# Patient Record
Sex: FEMALE | Race: WHITE | NOT HISPANIC OR LATINO | Employment: FULL TIME | URBAN - METROPOLITAN AREA
[De-identification: names, ages, dates, MRNs, and addresses within clinical notes are randomized per-mention and may not be internally consistent; named-entity substitution may affect disease eponyms.]

---

## 2017-05-09 ENCOUNTER — OFFICE VISIT (OUTPATIENT)
Dept: PODIATRY | Facility: CLINIC | Age: 37
End: 2017-05-09
Payer: COMMERCIAL

## 2017-05-09 VITALS — BODY MASS INDEX: 28.84 KG/M2 | WEIGHT: 156.75 LBS | HEIGHT: 62 IN

## 2017-05-09 DIAGNOSIS — M72.2 PLANTAR FASCIITIS: ICD-10-CM

## 2017-05-09 DIAGNOSIS — M21.6X9 EQUINUS DEFORMITY OF FOOT: Primary | ICD-10-CM

## 2017-05-09 DIAGNOSIS — S90.122A CONTUSION OF LESSER TOE OF LEFT FOOT WITHOUT DAMAGE TO NAIL, INITIAL ENCOUNTER: ICD-10-CM

## 2017-05-09 PROCEDURE — 1160F RVW MEDS BY RX/DR IN RCRD: CPT | Mod: S$GLB,,, | Performed by: PODIATRIST

## 2017-05-09 PROCEDURE — 99999 PR PBB SHADOW E&M-NEW PATIENT-LVL III: CPT | Mod: PBBFAC,,, | Performed by: PODIATRIST

## 2017-05-09 PROCEDURE — 99204 OFFICE O/P NEW MOD 45 MIN: CPT | Mod: S$GLB,,, | Performed by: PODIATRIST

## 2017-05-09 RX ORDER — METHYLPREDNISOLONE 4 MG/1
TABLET ORAL
Qty: 1 PACKAGE | Refills: 0 | Status: SHIPPED | OUTPATIENT
Start: 2017-05-09 | End: 2017-05-30

## 2017-05-09 RX ORDER — APREMILAST 30 MG/1
TABLET, FILM COATED ORAL
COMMUNITY
Start: 2017-03-08

## 2017-05-09 RX ORDER — KETOCONAZOLE 20 MG/ML
SHAMPOO, SUSPENSION TOPICAL
Refills: 2 | COMMUNITY
Start: 2017-04-24

## 2017-05-09 RX ORDER — CALCIPOTRIENE 0.05 MG/ML
SOLUTION TOPICAL
Refills: 2 | COMMUNITY
Start: 2017-04-19

## 2017-05-09 NOTE — PATIENT INSTRUCTIONS
- Given verbal and written instructions regarding stretching exercises to help reduce equinus deformity.    - Recommended wearing supportive shoes and insoles to offload the affected heel.    - Sof Sole Fit Series High Arch (found on [employer])    - Rest and ice the affected heel up to 20 minutes daily.    - Instructed to take ibuprofen prn for pain.    - Avoid barefoot walking, flats, and slippers as this will exacerbate symptoms.    - Avoid squatting, stooping, and running as these activities will exacerbate symptoms.

## 2017-05-09 NOTE — PROGRESS NOTES
Subjective:      Patient ID: Karuna Tabares is a 36 y.o. female.    Chief Complaint: Heel Pain (right heel pain x 2 months) and Foot Injury (kicked husbands boot 3/25/17 still feeling pain in left 5th toe)  Patient presents to clinic with the chief complaint of Rt. Heel pain x 2 months.  Describes pain as sharp and localized to the plantar heel.  Currently rates pain symptoms as a 5/10.  States symptoms are exacerbated with excessive weight bearing and are alleviated with rest.  Denies trauma to the affected extremity.  Has not attempted to self treat.  Also, relates injuring the Lt. 5th toe upon accidental kicking of her 's boot.  States the toe has been tender since.  States shoes that provide excessive pressure to the toe exacerbate aching pain symptoms.  Symptoms are alleviated with rest and removal of shoe gear.  Has attempted to self treat by wearing open toe shoes.  Denies any additional pedal complaints.       History reviewed. No pertinent past medical history.    Past Surgical History:   Procedure Laterality Date    TONSILLECTOMY         History reviewed. No pertinent family history.    Social History     Social History    Marital status:      Spouse name: N/A    Number of children: N/A    Years of education: N/A     Social History Main Topics    Smoking status: Current Every Day Smoker     Packs/day: 0.25     Years: 10.00     Types: Cigarettes    Smokeless tobacco: None    Alcohol use None    Drug use: None    Sexual activity: Not Asked     Other Topics Concern    None     Social History Narrative    None       Current Outpatient Prescriptions   Medication Sig Dispense Refill    calcipotriene 0.005 % ointment APPLY TO SCALP BID  2    ketoconazole (NIZORAL) 2 % shampoo USE TWICE A WEEK FOR 5 TO 10 MINUTES AS DIRECTED  2    methylPREDNISolone (MEDROL DOSEPACK) 4 mg tablet use as directed 1 Package 0    OTEZLA 30 mg Tab        No current facility-administered medications for  this visit.        Review of patient's allergies indicates:  No Known Allergies    Review of Systems   Constitution: Negative for chills and fever.   Cardiovascular: Negative for claudication and leg swelling.   Skin: Negative for color change, dry skin and nail changes.   Musculoskeletal: Positive for joint pain, joint swelling and myalgias.   Neurological: Negative for numbness and paresthesias.   Psychiatric/Behavioral: Negative for altered mental status.           Objective:      Physical Exam   Constitutional: She is oriented to person, place, and time. She appears well-developed and well-nourished. No distress.   Cardiovascular:   Pulses:       Dorsalis pedis pulses are 2+ on the right side, and 2+ on the left side.        Posterior tibial pulses are 2+ on the right side, and 2+ on the left side.   CFT <3 seconds bilateral.  Pedal hair growth present bilateral.   No varicosities noted bilateral.  Toes are warm to touch bilateral.     Musculoskeletal: She exhibits edema and tenderness.   Muscle strength 5/5 in all muscle groups bilateral.  No tenderness nor crepitation with ROM of foot/ankle joints bilateral.  Pain with palpation of the Rt. Plantar central heel and to the Lt. 5th toe.  Mild, localized edema noted to the Lt. 5th toe.  Bilateral gastrocnemius equinus.  Bilateral pes cavus foot type.  Bilateral gastrocnemius equinus.     Neurological: She is alert and oriented to person, place, and time. She has normal strength. No sensory deficit.   Light touch intact bilateral.     Skin: Skin is warm, dry and intact. No abrasion, no bruising, no burn, no ecchymosis, no laceration, no lesion, no petechiae and no rash noted. She is not diaphoretic. No cyanosis or erythema. No pallor. Nails show no clubbing.   Pedal skin normal temperature and texture bilateral.  Toenails x 10 appear normotrophic. Examination of the skin reveals no evidence of significant maceration, rashes, open lesions, suspicious appearing nevi  or other concerning lesions.              Assessment:       Encounter Diagnoses   Name Primary?    Equinus deformity of foot Yes    Plantar fasciitis     Contusion of lesser toe of left foot without damage to nail, initial encounter          Plan:       Karuna was seen today for heel pain and foot injury.    Diagnoses and all orders for this visit:    Equinus deformity of foot    Plantar fasciitis  -     methylPREDNISolone (MEDROL DOSEPACK) 4 mg tablet; use as directed    Contusion of lesser toe of left foot without damage to nail, initial encounter      I counseled the patient on her conditions, their implications and medical management.    - Given verbal and written instructions regarding stretching exercises to help reduce equinus deformity.    - Recommended wearing supportive shoes and OTC insoles to offload the affected heel.    - Advised to rest and ice the affected heel up to 20 minutes daily.    - Instructed to take ibuprofen prn for pain.    - Avoid barefoot walking, flats, and slippers as this will exacerbate symptoms.    - Avoid squatting, stooping, and running as these activities will exacerbate symptoms.      - Discussed wearing shoes with mesh in the toe box to reduce pressure to the Lt. 5th toe.    - Explained that is is not unusual for there to be pain in a toe when there has been contusion of the digit.  Explained it may take up to 3 months to fully resolve symptoms.  Advised to treat symptomatically.    - RTC in 6 weeks for follow up.    Bart Sharma DPM

## 2017-05-09 NOTE — MR AVS SNAPSHOT
"    [facility]  [address] [address]  Phone: [phone]                  Karuna Tabares   2017 3:00 PM   Office Visit    Description:  Female : 1980   Provider:  Bart Sharma DPM   Department:  [facility]           Reason for Visit     Heel Pain     Foot Injury           Diagnoses this Visit        Comments    Equinus deformity of foot    -  Primary     Plantar fasciitis                To Do List           Future Appointments        Provider Department Dept Phone    2017 3:00 PM Bart Sharma DPM [facility] [phone]      Goals (5 Years of Data)     None      Follow-Up and Disposition     Return in about 6 weeks (around 2017).      [facility]     [facility] -  Assistance  Unless otherwise directed by your provider, please contact [facility], our nurse care line that is available for  assistance.     Registered nurses in the [facility] provide: appointment scheduling, clinical advisement, health education, and other advisory services.  Call: [phone] (toll free)               Medications           Message regarding Medications     Verify the changes and/or additions to your medication regime listed below are the same as discussed with your clinician today.  If any of these changes or additions are incorrect, please notify your healthcare provider.             Verify that the below list of medications is an accurate representation of the medications you are currently taking.  If none reported, the list may be blank. If incorrect, please contact your healthcare provider. Carry this list with you in case of emergency.                Clinical Reference Information           Your Vitals Were     Height Weight BMI          5' 2" (1.575 m) 71.1 kg (156 lb 12 oz) 28.67 kg/m2        Allergies as of 2017     Not on File      Immunizations Administered on Date of Encounter - 2017     None "      [facility] Sign-Up     Activating your [facility] account is as easy as 1-2-3!     1) Visit [email or web address], select Sign Up Now, enter this activation code and your date of birth, then select Next.  [email or web address]  Expires: 6/23/2017  3:51 PM      2) Create a username and password to use when you visit [facility] in the future and select a security question in case you lose your password and select Next.    3) Enter your e-mail address and click Sign Up!    Additional Information  If you have questions, please e-mail [email or web address] or call [phone] to talk to our [facility] staff. Remember, [facility] is NOT to be used for urgent needs. For medical emergencies, dial 911.         Instructions    - Given verbal and written instructions regarding stretching exercises to help reduce equinus deformity.    - Recommended wearing supportive shoes and insoles to offload the affected heel.    - Sof Sole Fit Series High Arch (found on [employer])    - Rest and ice the affected heel up to 20 minutes daily.    - Instructed to take ibuprofen prn for pain.    - Avoid barefoot walking, flats, and slippers as this will exacerbate symptoms.    - Avoid squatting, stooping, and running as these activities will exacerbate symptoms.           Smoking Cessation     If you would like to quit smoking:   You may be eligible for free services if you are a [place] resident and started smoking cigarettes before September 1, 1988.  Call the [employer]oking [employer]ess[employer] ([employer]) toll free at (296) 955-8241 or (282) 885-4250.   Call 4-818-QUIT-NOW if you do not meet the above criteria.   Contact us via email: [email or web address]   View our website for more information: [email or web address]        Language Assistance Services     ATTENTION: Language assistance services are available, free of charge. Please call [phone].      ATENCIÓN: Si habla español, tiene a simons disposición servicios gratuitos de asistencia  lingüística. Faby al [phone].     JENNIFFER Ý: N?u b?n nói Ti?ng Vi?t, có các d?ch v? h? tr? ngôn ng? mi?n phí dành cho b?n. G?i s? [phone].         [facility] complies with applicable Federal civil rights laws and does not discriminate on the basis of race, color, national origin, age, disability, or sex.